# Patient Record
Sex: MALE | URBAN - METROPOLITAN AREA
[De-identification: names, ages, dates, MRNs, and addresses within clinical notes are randomized per-mention and may not be internally consistent; named-entity substitution may affect disease eponyms.]

---

## 2022-01-28 ENCOUNTER — OFFICE VISIT (OUTPATIENT)
Dept: URBAN - METROPOLITAN AREA CLINIC 6 | Facility: CLINIC | Age: 52
End: 2022-01-28
Payer: COMMERCIAL

## 2022-01-28 DIAGNOSIS — H40.1133 PRIMARY OPEN-ANGLE GLAUCOMA, SEVERE STAGE, BILATERAL: Primary | ICD-10-CM

## 2022-01-28 PROCEDURE — 92012 INTRM OPH EXAM EST PATIENT: CPT | Performed by: OPTOMETRIST

## 2022-01-28 PROCEDURE — 92133 CPTRZD OPH DX IMG PST SGM ON: CPT | Performed by: OPTOMETRIST

## 2022-01-28 PROCEDURE — 92083 EXTENDED VISUAL FIELD XM: CPT | Performed by: OPTOMETRIST

## 2022-01-28 ASSESSMENT — INTRAOCULAR PRESSURE
OD: 10
OS: 10

## 2022-01-28 NOTE — IMPRESSION/PLAN
Impression: Primary open-angle glaucoma, severe stage, bilateral: H40.1133. Plan: Primary Open Angle Glaucoma OU, severe stage -s/p SLT OU in 2016. IOP stable and low OU wo gtt-CCRx. OCT RNFl stable OU. HVF 24-2 unreliable OU with inferior/superior defects OU. Discussed with the patient that lack of compliance with drops/treatment and follow up visits can result in severe vision loss or blindness.

## 2022-08-19 ENCOUNTER — OFFICE VISIT (OUTPATIENT)
Dept: URBAN - METROPOLITAN AREA CLINIC 6 | Facility: CLINIC | Age: 52
End: 2022-08-19
Payer: COMMERCIAL

## 2022-08-19 DIAGNOSIS — H40.1133 PRIMARY OPEN-ANGLE GLAUCOMA, SEVERE STAGE, BILATERAL: Primary | ICD-10-CM

## 2022-08-19 DIAGNOSIS — H25.813 COMBINED FORMS OF AGE-RELATED CATARACT, BILATERAL: ICD-10-CM

## 2022-08-19 PROCEDURE — 92250 FUNDUS PHOTOGRAPHY W/I&R: CPT | Performed by: OPTOMETRIST

## 2022-08-19 PROCEDURE — 92014 COMPRE OPH EXAM EST PT 1/>: CPT | Performed by: OPTOMETRIST

## 2022-08-19 ASSESSMENT — INTRAOCULAR PRESSURE
OS: 12
OD: 12
OD: 13

## 2022-08-19 NOTE — IMPRESSION/PLAN
Impression: Primary open-angle glaucoma, severe stage, bilateral: H40.1133. Plan: Primary Open Angle Glaucoma OU, severe stage -s/p SLT OU in 2016. IOP stable and low OU without drops -CCRx. Fundus photos today show no change from previous. Discussed with the patient that lack of compliance with drops/treatment and follow up visits can result in severe vision loss or blindness.

## 2023-02-24 ENCOUNTER — OFFICE VISIT (OUTPATIENT)
Dept: URBAN - METROPOLITAN AREA CLINIC 6 | Facility: CLINIC | Age: 53
End: 2023-02-24
Payer: COMMERCIAL

## 2023-02-24 DIAGNOSIS — H40.1133 PRIMARY OPEN-ANGLE GLAUCOMA, SEVERE STAGE, BILATERAL: Primary | ICD-10-CM

## 2023-02-24 PROCEDURE — 92012 INTRM OPH EXAM EST PATIENT: CPT | Performed by: OPTOMETRIST

## 2023-02-24 PROCEDURE — 92133 CPTRZD OPH DX IMG PST SGM ON: CPT | Performed by: OPTOMETRIST

## 2023-02-24 PROCEDURE — 92083 EXTENDED VISUAL FIELD XM: CPT | Performed by: OPTOMETRIST

## 2023-02-24 ASSESSMENT — INTRAOCULAR PRESSURE
OD: 12
OS: 11

## 2023-02-24 NOTE — IMPRESSION/PLAN
Impression: Primary open-angle glaucoma, severe stage, bilateral: H40.8763. Plan: Primary Open Angle Glaucoma OU, severe stage -s/p SLT OU in 2016. IOP stable and low OU without drops -CCRx. RNFL ordered today stable to last with 71/84 with sup/inf thin quadrants OD/temp thin quadrant OS. HVF 24-2 ordered today with inf nasal defect OD/central defects OS with high false positives OD.

## 2023-08-25 ENCOUNTER — OFFICE VISIT (OUTPATIENT)
Dept: URBAN - METROPOLITAN AREA CLINIC 6 | Facility: CLINIC | Age: 53
End: 2023-08-25
Payer: COMMERCIAL

## 2023-08-25 DIAGNOSIS — H25.813 COMBINED FORMS OF AGE-RELATED CATARACT, BILATERAL: ICD-10-CM

## 2023-08-25 DIAGNOSIS — H40.1133 PRIMARY OPEN-ANGLE GLAUCOMA, SEVERE STAGE, BILATERAL: Primary | ICD-10-CM

## 2023-08-25 PROCEDURE — 92012 INTRM OPH EXAM EST PATIENT: CPT | Performed by: OPTOMETRIST

## 2023-08-25 PROCEDURE — 92250 FUNDUS PHOTOGRAPHY W/I&R: CPT | Performed by: OPTOMETRIST

## 2023-08-25 ASSESSMENT — INTRAOCULAR PRESSURE
OD: 13
OS: 14

## 2024-02-23 ENCOUNTER — OFFICE VISIT (OUTPATIENT)
Dept: URBAN - METROPOLITAN AREA CLINIC 6 | Facility: CLINIC | Age: 54
End: 2024-02-23
Payer: COMMERCIAL

## 2024-02-23 DIAGNOSIS — H40.1133 PRIMARY OPEN-ANGLE GLAUCOMA, SEVERE STAGE, BILATERAL: Primary | ICD-10-CM

## 2024-02-23 PROCEDURE — 92083 EXTENDED VISUAL FIELD XM: CPT | Performed by: OPTOMETRIST

## 2024-02-23 PROCEDURE — 92133 CPTRZD OPH DX IMG PST SGM ON: CPT | Performed by: OPTOMETRIST

## 2024-02-23 PROCEDURE — 99213 OFFICE O/P EST LOW 20 MIN: CPT | Performed by: OPTOMETRIST

## 2024-02-23 ASSESSMENT — INTRAOCULAR PRESSURE
OS: 10
OD: 11

## 2025-01-15 ENCOUNTER — OFFICE VISIT (OUTPATIENT)
Dept: URBAN - METROPOLITAN AREA CLINIC 6 | Facility: CLINIC | Age: 55
End: 2025-01-15
Payer: COMMERCIAL

## 2025-01-15 DIAGNOSIS — H40.022 OPEN ANGLE WITH BORDERLINE FINDINGS, HIGH RISK, LEFT EYE: ICD-10-CM

## 2025-01-15 DIAGNOSIS — H40.1112 PRIMARY OPEN-ANGLE GLAUCOMA, MODERATE STAGE, RIGHT EYE: Primary | ICD-10-CM

## 2025-01-15 PROCEDURE — 92014 COMPRE OPH EXAM EST PT 1/>: CPT | Performed by: OPTOMETRIST

## 2025-01-15 PROCEDURE — 92250 FUNDUS PHOTOGRAPHY W/I&R: CPT | Performed by: OPTOMETRIST

## 2025-01-15 ASSESSMENT — INTRAOCULAR PRESSURE
OS: 11
OD: 12

## 2025-07-17 ENCOUNTER — OFFICE VISIT (OUTPATIENT)
Dept: URBAN - METROPOLITAN AREA CLINIC 6 | Facility: CLINIC | Age: 55
End: 2025-07-17
Payer: COMMERCIAL

## 2025-07-17 DIAGNOSIS — H40.1133 PRIMARY OPEN-ANGLE GLAUCOMA, SEVERE STAGE, BILATERAL: Primary | ICD-10-CM

## 2025-07-17 PROCEDURE — 92012 INTRM OPH EXAM EST PATIENT: CPT | Performed by: OPTOMETRIST

## 2025-07-17 PROCEDURE — 92083 EXTENDED VISUAL FIELD XM: CPT | Performed by: OPTOMETRIST

## 2025-07-17 PROCEDURE — 92133 CPTRZD OPH DX IMG PST SGM ON: CPT | Performed by: OPTOMETRIST

## 2025-07-17 ASSESSMENT — INTRAOCULAR PRESSURE
OS: 12
OD: 12